# Patient Record
Sex: FEMALE | Race: WHITE | Employment: OTHER | URBAN - NONMETROPOLITAN AREA
[De-identification: names, ages, dates, MRNs, and addresses within clinical notes are randomized per-mention and may not be internally consistent; named-entity substitution may affect disease eponyms.]

---

## 2017-10-09 ENCOUNTER — HOSPITAL ENCOUNTER (OUTPATIENT)
Dept: OCCUPATIONAL THERAPY | Age: 77
Setting detail: THERAPIES SERIES
Discharge: HOME | End: 2017-10-09
Payer: MEDICARE

## 2017-10-09 PROCEDURE — G8992 OTHER PT/OT  D/C STATUS: HCPCS

## 2017-10-09 PROCEDURE — G8991 OTHER PT/OT GOAL STATUS: HCPCS

## 2017-10-09 PROCEDURE — 97165 OT EVAL LOW COMPLEX 30 MIN: CPT

## 2017-10-09 PROCEDURE — G8990 OTHER PT/OT CURRENT STATUS: HCPCS

## 2017-10-09 NOTE — PROGRESS NOTES
[facility]  OCCUPATIONAL THERAPY  DRIVING EVALUATION    PATIENT: Jana Ray OF BIRTH:  1940  GENDER:  female  CSN: [id number]   REFERRING PHYSICIAN:   Christiano Meadows.  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 She reports she did not restrict her driving prior to the stroke, but her  is the principal  for longer distances. PMH: Please see medical history questionnaire for past medical history, allergies, and medications. PATIENT GOALS:    Wants to return to driving independently. OBJECTIVE  VISUAL SKILLS(using the [employer] 2000 Visual Evaluator)       FAR VISUAL ACUITY:   Pass. 20/30 R, 20/40 L with glasses     STEREO DEPTH:   Pass. FUSION:    Pass. PERIPHERAL VISION:  Pass. 3/3 identified bilaterally at 45, 70, and 85 degree peripheral angles. DYNAVISION TESTIN hits in 60 second self paced trial.  Considered slow for driving. PHYSICAL SKILLS  RANGE OF MOTION:Impaired L shoulder due to arthritis. STRENGTH: Impaired L shoulder due to arthritis  TRANSFER IN/OUT OF SIMULATOR: Within functional limits for driving  AMBULATION: Within functional limits for driving    IN VEHICLE MANIPULATION SKILLS:  Steering Wheel:Within functional limits for driving   Gas Pedal:  Within functional limits for driving  Brake Pedal: Within functional limits for driving  Emergency Brake:not tested  Gear Shift: Within functional limits for driving  Turn Signal: Within functional limits for driving  Seat Belt: Within functional limits for driving     COGNITIVE SKILLS  ORIENTATION:  Within functional limits for driving  ATTENTION SPAN:Impaired: errors with short blessed test, error with clock drawing test (executive function), error with trail making test but self corrected.   FRUSTRATION TOLERANCE:Within functional limits for driving  IMPULSIVITY:Within functional limits for driving  DIRECTION FOLLOWING:directions needed repeating at times. R/L DISCRIMINATION:Within functional limits for driving  MEMORY:2 errors with short term recall. At first, stated it was 1917, not 2017. JUDGMENT: some limited insight into her cognitive limitations. COGNITIVE TESTING:     SHORT BLESSED TEST:   The Short Blessed Test is a screening tool to assess orientation, short term memory, long term memory, and reversal of learning. Overall this patient received a score of  Ó5-9 which indicates a questionable impairment in cognition. Demonstrated score of 8.  2 errors with short term recall, 2 errors with recitation of months backward and was unable to finish the task. Trail Making Test A - Completed in 1 minute, 3 seconds. 1 error with self correction. Clock Drawing Test - 3/4, error with hour hand, needed assist to correct. REACTION TIME TESTING:    Impaired: Delayed reaction time. Patient scored at an average of  78.2 feet  in 10 /10 trials for reaction time. The national established norm is 60 feet which equates to 0.75 second reaction time. Comments      THREAT RECOGNITION TESTING:    Not tested. ASSESSMENT AND PLAN    RESULTS: Patient tested unsafe to return to independent driving    RECOMMENDATIONS:   No driving at this time, continue to allow  to be principal . May retest in the future if cognition improves. Patient has been instructed to contact referring physician to discuss results of this evaluation. Patient has been informed that his or her physician is responsible for making the final decision regarding driving status.  Thank you for this referral.      Time in: 1645  Time out: 1725  Timed treatment: 0  Total time: 40    OT G-codes  Functional Assessment Tool Used: Short Blessed Test  Score: 8  Functional Limitation: Other OT primary  Other OT Primary Current Status

## 2017-11-07 ENCOUNTER — PROCEDURE VISIT (OUTPATIENT)
Dept: NEUROLOGY | Age: 77
End: 2017-11-07
Payer: MEDICARE

## 2017-11-07 DIAGNOSIS — M54.12 CERVICAL RADICULOPATHY: ICD-10-CM

## 2017-11-07 DIAGNOSIS — R20.0 BILATERAL HAND NUMBNESS: ICD-10-CM

## 2017-11-07 DIAGNOSIS — G56.22 ULNAR NEUROPATHY OF LEFT UPPER EXTREMITY: Primary | ICD-10-CM

## 2017-11-07 DIAGNOSIS — R29.898 LEFT HAND WEAKNESS: ICD-10-CM

## 2017-11-07 PROCEDURE — 95913 NRV CNDJ TEST 13/> STUDIES: CPT | Performed by: PSYCHIATRY & NEUROLOGY

## 2017-11-07 PROCEDURE — 95886 MUSC TEST DONE W/N TEST COMP: CPT | Performed by: PSYCHIATRY & NEUROLOGY

## 2019-11-07 ENCOUNTER — HOSPITAL ENCOUNTER (OUTPATIENT)
Dept: CT IMAGING | Age: 79
Discharge: HOME | End: 2019-11-07
Payer: MEDICARE

## 2019-11-07 DIAGNOSIS — E21.3 HYPERPARATHYROIDISM (HCC): ICD-10-CM

## 2019-11-07 DIAGNOSIS — E83.52 HYPERCALCEMIA: ICD-10-CM

## 2019-11-07 LAB — POC CREATININE WHOLE BLOOD: 0.7 MG/DL (ref 0.5–1.2)

## 2019-11-07 PROCEDURE — 70492 CT SFT TSUE NCK W/O & W/DYE: CPT

## 2019-11-07 PROCEDURE — 6360000004 HC RX CONTRAST MEDICATION: Performed by: NURSE PRACTITIONER

## 2019-11-07 PROCEDURE — 82565 ASSAY OF CREATININE: CPT

## 2019-11-07 RX ADMIN — IOPAMIDOL 80 ML: 755 INJECTION, SOLUTION INTRAVENOUS at 09:57

## 2020-06-10 ENCOUNTER — HOSPITAL ENCOUNTER (OUTPATIENT)
Dept: NUCLEAR MEDICINE | Age: 80
Discharge: HOME | End: 2020-06-10
Payer: MEDICARE

## 2020-06-10 PROCEDURE — 3430000000 HC RX DIAGNOSTIC RADIOPHARMACEUTICAL: Performed by: INTERNAL MEDICINE

## 2020-06-10 PROCEDURE — 78072 PARATHYRD PLANAR W/SPECT&CT: CPT

## 2020-06-10 PROCEDURE — A9500 TC99M SESTAMIBI: HCPCS | Performed by: INTERNAL MEDICINE

## 2020-06-10 RX ADMIN — Medication 27.4 MILLICURIE: at 10:10

## 2021-04-07 ENCOUNTER — HOSPITAL ENCOUNTER (OUTPATIENT)
Dept: ULTRASOUND IMAGING | Age: 81
Discharge: HOME | End: 2021-04-07
Payer: MEDICARE

## 2021-04-07 DIAGNOSIS — E21.3 HYPERPARATHYROIDISM, UNSPECIFIED (HCC): ICD-10-CM

## 2021-04-07 DIAGNOSIS — D35.1 PARATHYROID ADENOMA: ICD-10-CM

## 2021-04-07 PROCEDURE — 76536 US EXAM OF HEAD AND NECK: CPT
